# Patient Record
Sex: FEMALE | Race: WHITE | NOT HISPANIC OR LATINO | Employment: OTHER | URBAN - NONMETROPOLITAN AREA
[De-identification: names, ages, dates, MRNs, and addresses within clinical notes are randomized per-mention and may not be internally consistent; named-entity substitution may affect disease eponyms.]

---

## 2023-12-15 PROBLEM — N18.31 CHRONIC KIDNEY DISEASE, STAGE 3A (MULTI): Status: ACTIVE | Noted: 2023-12-15

## 2023-12-15 PROBLEM — E78.5 HYPERLIPIDEMIA: Status: ACTIVE | Noted: 2023-12-15

## 2023-12-15 PROBLEM — R06.02 SHORTNESS OF BREATH: Status: ACTIVE | Noted: 2023-12-15

## 2023-12-15 PROBLEM — I42.8 NONISCHEMIC CARDIOMYOPATHY (MULTI): Status: ACTIVE | Noted: 2023-12-15

## 2023-12-15 PROBLEM — C80.1 CANCER (MULTI): Status: ACTIVE | Noted: 2023-12-15

## 2023-12-15 PROBLEM — I48.0 PAROXYSMAL ATRIAL FIBRILLATION (MULTI): Status: ACTIVE | Noted: 2023-12-15

## 2023-12-15 PROBLEM — E11.9 DIABETES MELLITUS TYPE II, NON INSULIN DEPENDENT (MULTI): Status: ACTIVE | Noted: 2023-12-15

## 2023-12-15 RX ORDER — LORATADINE 10 MG/1
1 TABLET ORAL DAILY PRN
COMMUNITY

## 2023-12-15 RX ORDER — LEVOTHYROXINE SODIUM 137 UG/1
137 TABLET ORAL
COMMUNITY
Start: 2021-09-09

## 2023-12-15 RX ORDER — CARVEDILOL 6.25 MG/1
1 TABLET ORAL 2 TIMES DAILY
COMMUNITY
Start: 2022-03-03

## 2023-12-15 RX ORDER — DEXAMETHASONE 0.5 MG/5ML
1 SOLUTION ORAL 4 TIMES DAILY PRN
COMMUNITY
Start: 2022-12-30

## 2023-12-15 RX ORDER — GABAPENTIN 800 MG/1
1 TABLET ORAL 3 TIMES DAILY
COMMUNITY

## 2023-12-15 RX ORDER — TOLTERODINE 4 MG/1
4 CAPSULE, EXTENDED RELEASE ORAL
COMMUNITY
Start: 2022-02-25

## 2023-12-15 RX ORDER — METFORMIN HYDROCHLORIDE 1000 MG/1
1000 TABLET ORAL
COMMUNITY

## 2023-12-15 RX ORDER — PROMETHAZINE HYDROCHLORIDE 50 MG/1
25 TABLET ORAL
COMMUNITY
Start: 2022-02-21

## 2023-12-15 RX ORDER — FUROSEMIDE 40 MG/1
40 TABLET ORAL
COMMUNITY
Start: 2023-05-11

## 2023-12-15 RX ORDER — NAPROXEN SODIUM 220 MG/1
81 TABLET, FILM COATED ORAL
COMMUNITY
Start: 2022-09-03

## 2023-12-15 RX ORDER — DICLOFENAC SODIUM 10 MG/G
2 GEL TOPICAL 4 TIMES DAILY
COMMUNITY

## 2023-12-15 RX ORDER — GLUCOSAMINE/CHONDR SU A SOD 167-133 MG
500 CAPSULE ORAL
COMMUNITY

## 2023-12-15 RX ORDER — ALBUTEROL SULFATE 90 UG/1
2 AEROSOL, METERED RESPIRATORY (INHALATION) 2 TIMES DAILY
COMMUNITY

## 2023-12-15 RX ORDER — BUDESONIDE AND FORMOTEROL FUMARATE DIHYDRATE 160; 4.5 UG/1; UG/1
2 AEROSOL RESPIRATORY (INHALATION) 2 TIMES DAILY
COMMUNITY

## 2023-12-15 RX ORDER — AMLODIPINE BESYLATE 2.5 MG/1
2.5 TABLET ORAL
COMMUNITY
Start: 2022-01-25

## 2023-12-15 RX ORDER — FAMOTIDINE 20 MG/1
1 TABLET, FILM COATED ORAL NIGHTLY
COMMUNITY
Start: 2022-04-28 | End: 2024-02-01

## 2023-12-15 RX ORDER — LISINOPRIL 10 MG/1
1 TABLET ORAL 2 TIMES DAILY
COMMUNITY
Start: 2021-12-01

## 2023-12-15 RX ORDER — ONDANSETRON 4 MG/1
4 TABLET, ORALLY DISINTEGRATING ORAL EVERY 8 HOURS PRN
COMMUNITY

## 2023-12-15 RX ORDER — HYDROXYZINE HYDROCHLORIDE 10 MG/1
10 TABLET, FILM COATED ORAL 3 TIMES DAILY PRN
COMMUNITY
Start: 2023-10-18

## 2023-12-15 RX ORDER — NYSTATIN 100000 [USP'U]/ML
500000 SUSPENSION ORAL
COMMUNITY
Start: 2023-02-25

## 2023-12-15 RX ORDER — LIOTHYRONINE SODIUM 5 UG/1
5 TABLET ORAL 2 TIMES DAILY
COMMUNITY

## 2023-12-15 RX ORDER — FLUTICASONE PROPIONATE 50 MCG
1 SPRAY, SUSPENSION (ML) NASAL 2 TIMES DAILY
COMMUNITY
Start: 2022-09-02

## 2023-12-15 RX ORDER — FOLIC ACID 1 MG/1
1 TABLET ORAL
COMMUNITY
Start: 2023-03-08

## 2023-12-19 ENCOUNTER — OFFICE VISIT (OUTPATIENT)
Dept: CARDIOLOGY | Facility: CLINIC | Age: 73
End: 2023-12-19
Payer: MEDICARE

## 2023-12-19 VITALS
HEIGHT: 63 IN | HEART RATE: 74 BPM | BODY MASS INDEX: 46.07 KG/M2 | WEIGHT: 260 LBS | DIASTOLIC BLOOD PRESSURE: 78 MMHG | SYSTOLIC BLOOD PRESSURE: 110 MMHG

## 2023-12-19 DIAGNOSIS — I48.0 PAROXYSMAL ATRIAL FIBRILLATION (MULTI): ICD-10-CM

## 2023-12-19 DIAGNOSIS — I42.8 NONISCHEMIC CARDIOMYOPATHY (MULTI): Primary | ICD-10-CM

## 2023-12-19 DIAGNOSIS — E78.2 MIXED HYPERLIPIDEMIA: ICD-10-CM

## 2023-12-19 PROCEDURE — 1036F TOBACCO NON-USER: CPT | Performed by: INTERNAL MEDICINE

## 2023-12-19 PROCEDURE — 1159F MED LIST DOCD IN RCRD: CPT | Performed by: INTERNAL MEDICINE

## 2023-12-19 PROCEDURE — 4010F ACE/ARB THERAPY RXD/TAKEN: CPT | Performed by: INTERNAL MEDICINE

## 2023-12-19 PROCEDURE — 99214 OFFICE O/P EST MOD 30 MIN: CPT | Performed by: INTERNAL MEDICINE

## 2023-12-19 PROCEDURE — 3078F DIAST BP <80 MM HG: CPT | Performed by: INTERNAL MEDICINE

## 2023-12-19 PROCEDURE — 3074F SYST BP LT 130 MM HG: CPT | Performed by: INTERNAL MEDICINE

## 2023-12-19 RX ORDER — CYANOCOBALAMIN (VITAMIN B-12) 500 MCG
400 TABLET ORAL DAILY
COMMUNITY

## 2023-12-19 RX ORDER — TRAMADOL HYDROCHLORIDE 50 MG/1
50 TABLET ORAL EVERY 6 HOURS PRN
COMMUNITY

## 2023-12-19 RX ORDER — CYCLOBENZAPRINE HCL 10 MG
10 TABLET ORAL 3 TIMES DAILY PRN
COMMUNITY

## 2023-12-19 RX ORDER — PNV NO.95/FERROUS FUM/FOLIC AC 28MG-0.8MG
1 TABLET ORAL DAILY
COMMUNITY

## 2023-12-19 NOTE — LETTER
December 19, 2023     Merry Diaz DO  [address]  [place] [address]    Patient: Clara Soto   YOB: 1950   Date of Visit: 12/19/2023       Dear Dr. Merry Diaz DO:    Thank you for referring Clara Soto to me for evaluation. Below are my notes for this consultation.  If you have questions, please do not hesitate to call me. I look forward to following your patient along with you.       Sincerely,     Arturo Handy MD      CC: No Recipients  ______________________________________________________________________________________    Subjective   Clara Soto is a 73 y.o. female       Chief Complaint    Follow-up          HPI     Patient returns in follow-up of problems as noted.  In interim she is done well.  She has a metastatic malignancy of unknown primary.  She has been treated for with chemotherapy.  She states there may be an upcoming right maxillary sinus surgery because of a tumor that is been identified there.  She apparently has a couple spots on her lungs and multiple spots on the liver.  Nonetheless she looks good and is done well has not lost weight and has no overt manifestations of malignancy.    In regards to her cardiomyopathy an echocardiogram was performed last year in [place] and the results are favorable and do not suggest the need for further adjustments in therapy.  She is tolerating her treatment for hyperlipidemia and she feels convinced that she can tell if or when she is in atrial fibrillation.  She would seek care if it arises.  We discussed the concept of antiarrhythmic therapy but she is reluctant and/or hesitant because of the complexity of her malignancy treatment and the possibility that anticoagulants would cause problems and I believe it is a reasonable concern.  Because of this and the fact that she is under constant surveillance by numerous healthcare providers it is my belief that paroxysms of atrial  "fibrillation probably would be noted or detected and treated appropriately and hence we do not feel that she needs to be mandated to antithrombotic therapy.        Visit Vitals  /78 (BP Location: Left arm, Patient Position: Sitting)   Pulse 74   Ht 1.6 m (5' 3\")   Wt 118 kg (260 lb)   BMI 46.06 kg/m²   Smoking Status Former   BSA 2.29 m²        Objective   Physical Exam  Constitutional:       Appearance: Normal appearance. She is normal weight.   HENT:      Nose: Nose normal.   Neck:      Vascular: No carotid bruit.   Cardiovascular:      Rate and Rhythm: Normal rate.      Pulses: Normal pulses.      Heart sounds: Normal heart sounds.   Pulmonary:      Effort: Pulmonary effort is normal.   Abdominal:      General: Bowel sounds are normal.      Palpations: Abdomen is soft.   Genitourinary:     Rectum: Normal.   Musculoskeletal:         General: Normal range of motion.      Cervical back: Normal range of motion.      Right lower leg: No edema.      Left lower leg: No edema.   Skin:     General: Skin is warm and dry.   Neurological:      General: No focal deficit present.      Mental Status: She is alert.   Psychiatric:         Mood and Affect: Mood normal.         Behavior: Behavior normal.         Thought Content: Thought content normal.         Judgment: Judgment normal.         Current Medications    Current Outpatient Medications:   •  albuterol 90 mcg/actuation inhaler, Inhale 2 puffs twice a day., Disp: , Rfl:   •  amLODIPine (Norvasc) 2.5 mg tablet, Take 1 tablet (2.5 mg) by mouth once daily., Disp: , Rfl:   •  aspirin 81 mg chewable tablet, Chew 1 tablet (81 mg) once daily., Disp: , Rfl:   •  budesonide-formoteroL (Symbicort) 160-4.5 mcg/actuation inhaler, Inhale 2 puffs twice a day., Disp: , Rfl:   •  calcium carbonate-vitamin D3 (Oyster Shell Calcium-Vit D3) 500 mg-10 mcg (400 unit) tablet, Take 1 tablet by mouth once daily., Disp: , Rfl:   •  carvedilol (Coreg) 6.25 mg tablet, Take 1 tablet (6.25 mg) " by mouth 2 times a day., Disp: , Rfl:   •  cholecalciferol (Vitamin D3) 10 MCG (400 UNIT) tablet, Take 1 tablet (10 mcg) by mouth once daily., Disp: , Rfl:   •  cyclobenzaprine (Flexeril) 10 mg tablet, Take 1 tablet (10 mg) by mouth 3 times a day as needed for muscle spasms., Disp: , Rfl:   •  dexAMETHasone 0.5 mg/5 mL oral liquid, Take 10 mL (1 mg) by mouth 4 times a day as needed., Disp: , Rfl:   •  diclofenac sodium (Voltaren) 1 % gel gel, Apply 0.5 Applications topically 4 times a day., Disp: , Rfl:   •  famotidine (Pepcid) 20 mg tablet, Take 1 tablet (20 mg) by mouth once daily at bedtime., Disp: , Rfl:   •  fluticasone (Flonase) 50 mcg/actuation nasal spray, Administer 1 spray into affected nostril(s) twice a day., Disp: , Rfl:   •  furosemide (Lasix) 40 mg tablet, Take 1 tablet (40 mg) by mouth once daily., Disp: , Rfl:   •  gabapentin (Neurontin) 800 mg tablet, Take 1 tablet (800 mg) by mouth 3 times a day., Disp: , Rfl:   •  hydrOXYzine HCL (Atarax) 10 mg tablet, Take 1 tablet (10 mg) by mouth 3 times a day as needed., Disp: , Rfl:   •  levothyroxine (Synthroid, Levoxyl) 137 mcg tablet, Take 1 tablet (137 mcg) by mouth once daily., Disp: , Rfl:   •  liothyronine (Cytomel) 5 mcg tablet, Take 1 tablet (5 mcg) by mouth twice a day., Disp: , Rfl:   •  lisinopril 10 mg tablet, Take 1 tablet (10 mg) by mouth 2 times a day., Disp: , Rfl:   •  loratadine (Claritin) 10 mg tablet, Take 1 tablet (10 mg) by mouth once daily as needed., Disp: , Rfl:   •  metFORMIN (Glucophage) 1,000 mg tablet, Take 1 tablet (1,000 mg) by mouth 2 times a day with meals., Disp: , Rfl:   •  niacin 500 mg tablet, Take 1 tablet (500 mg) by mouth once daily with breakfast., Disp: , Rfl:   •  nystatin (Mycostatin) 100,000 unit/mL suspension, Place 5 mL (500,000 Units) into mouth between cheek and gum., Disp: , Rfl:   •  ondansetron ODT (Zofran-ODT) 4 mg disintegrating tablet, Take 1 tablet (4 mg) by mouth every 8 hours if needed., Disp: , Rfl:    •  promethazine (Phenergan) 50 mg tablet, Take 0.5 tablets (25 mg) by mouth., Disp: , Rfl:   •  tolterodine LA (Detrol LA) 4 mg 24 hr capsule, Take 1 capsule (4 mg) by mouth once daily., Disp: , Rfl:   •  traMADol (Ultram) 50 mg tablet, Take 1 tablet (50 mg) by mouth every 6 hours if needed for severe pain (7 - 10)., Disp: , Rfl:   •  folic acid (Folvite) 1 mg tablet, Take 1 tablet (1 mg) by mouth once daily., Disp: , Rfl:                      Assessment/Plan     1. Nonischemic cardiomyopathy (CMS/HCC)  Ejection fraction 45% and functional class I.  No adjustments in therapy needed.    2. Paroxysmal atrial fibrillation (CMS/HCC)  No symptoms.  The patient was educated regarding symptoms to watch for and she will call if they arise or occur.  We trust that other healthcare providers will detect irregular rhythm if it arises    3. Mixed hyperlipidemia  Good control on current therapy.        Scribe Attestation  By signing my name below, I, Moni WASSERMAN LPN , Scribe   attest that this documentation has been prepared under the direction and in the presence of Arturo Handy MD.

## 2023-12-19 NOTE — PROGRESS NOTES
"Subjective   Clara Soto is a 73 y.o. female       Chief Complaint    Follow-up          HPI     Patient returns in follow-up of problems as noted.  In interim she is done well.  She has a metastatic malignancy of unknown primary.  She has been treated for with chemotherapy.  She states there may be an upcoming right maxillary sinus surgery because of a tumor that is been identified there.  She apparently has a couple spots on her lungs and multiple spots on the liver.  Nonetheless she looks good and is done well has not lost weight and has no overt manifestations of malignancy.    In regards to her cardiomyopathy an echocardiogram was performed last year in [place] and the results are favorable and do not suggest the need for further adjustments in therapy.  She is tolerating her treatment for hyperlipidemia and she feels convinced that she can tell if or when she is in atrial fibrillation.  She would seek care if it arises.  We discussed the concept of antiarrhythmic therapy but she is reluctant and/or hesitant because of the complexity of her malignancy treatment and the possibility that anticoagulants would cause problems and I believe it is a reasonable concern.  Because of this and the fact that she is under constant surveillance by numerous healthcare providers it is my belief that paroxysms of atrial fibrillation probably would be noted or detected and treated appropriately and hence we do not feel that she needs to be mandated to antithrombotic therapy.        Visit Vitals  /78 (BP Location: Left arm, Patient Position: Sitting)   Pulse 74   Ht 1.6 m (5' 3\")   Wt 118 kg (260 lb)   BMI 46.06 kg/m²   Smoking Status Former   BSA 2.29 m²        Objective   Physical Exam  Constitutional:       Appearance: Normal appearance. She is normal weight.   HENT:      Nose: Nose normal.   Neck:      Vascular: No carotid bruit.   Cardiovascular:      Rate and Rhythm: Normal rate.      Pulses: Normal pulses. "      Heart sounds: Normal heart sounds.   Pulmonary:      Effort: Pulmonary effort is normal.   Abdominal:      General: Bowel sounds are normal.      Palpations: Abdomen is soft.   Genitourinary:     Rectum: Normal.   Musculoskeletal:         General: Normal range of motion.      Cervical back: Normal range of motion.      Right lower leg: No edema.      Left lower leg: No edema.   Skin:     General: Skin is warm and dry.   Neurological:      General: No focal deficit present.      Mental Status: She is alert.   Psychiatric:         Mood and Affect: Mood normal.         Behavior: Behavior normal.         Thought Content: Thought content normal.         Judgment: Judgment normal.         Current Medications    Current Outpatient Medications:     albuterol 90 mcg/actuation inhaler, Inhale 2 puffs twice a day., Disp: , Rfl:     amLODIPine (Norvasc) 2.5 mg tablet, Take 1 tablet (2.5 mg) by mouth once daily., Disp: , Rfl:     aspirin 81 mg chewable tablet, Chew 1 tablet (81 mg) once daily., Disp: , Rfl:     budesonide-formoteroL (Symbicort) 160-4.5 mcg/actuation inhaler, Inhale 2 puffs twice a day., Disp: , Rfl:     calcium carbonate-vitamin D3 (Oyster Shell Calcium-Vit D3) 500 mg-10 mcg (400 unit) tablet, Take 1 tablet by mouth once daily., Disp: , Rfl:     carvedilol (Coreg) 6.25 mg tablet, Take 1 tablet (6.25 mg) by mouth 2 times a day., Disp: , Rfl:     cholecalciferol (Vitamin D3) 10 MCG (400 UNIT) tablet, Take 1 tablet (10 mcg) by mouth once daily., Disp: , Rfl:     cyclobenzaprine (Flexeril) 10 mg tablet, Take 1 tablet (10 mg) by mouth 3 times a day as needed for muscle spasms., Disp: , Rfl:     dexAMETHasone 0.5 mg/5 mL oral liquid, Take 10 mL (1 mg) by mouth 4 times a day as needed., Disp: , Rfl:     diclofenac sodium (Voltaren) 1 % gel gel, Apply 0.5 Applications topically 4 times a day., Disp: , Rfl:     famotidine (Pepcid) 20 mg tablet, Take 1 tablet (20 mg) by mouth once daily at bedtime., Disp: , Rfl:      fluticasone (Flonase) 50 mcg/actuation nasal spray, Administer 1 spray into affected nostril(s) twice a day., Disp: , Rfl:     furosemide (Lasix) 40 mg tablet, Take 1 tablet (40 mg) by mouth once daily., Disp: , Rfl:     gabapentin (Neurontin) 800 mg tablet, Take 1 tablet (800 mg) by mouth 3 times a day., Disp: , Rfl:     hydrOXYzine HCL (Atarax) 10 mg tablet, Take 1 tablet (10 mg) by mouth 3 times a day as needed., Disp: , Rfl:     levothyroxine (Synthroid, Levoxyl) 137 mcg tablet, Take 1 tablet (137 mcg) by mouth once daily., Disp: , Rfl:     liothyronine (Cytomel) 5 mcg tablet, Take 1 tablet (5 mcg) by mouth twice a day., Disp: , Rfl:     lisinopril 10 mg tablet, Take 1 tablet (10 mg) by mouth 2 times a day., Disp: , Rfl:     loratadine (Claritin) 10 mg tablet, Take 1 tablet (10 mg) by mouth once daily as needed., Disp: , Rfl:     metFORMIN (Glucophage) 1,000 mg tablet, Take 1 tablet (1,000 mg) by mouth 2 times a day with meals., Disp: , Rfl:     niacin 500 mg tablet, Take 1 tablet (500 mg) by mouth once daily with breakfast., Disp: , Rfl:     nystatin (Mycostatin) 100,000 unit/mL suspension, Place 5 mL (500,000 Units) into mouth between cheek and gum., Disp: , Rfl:     ondansetron ODT (Zofran-ODT) 4 mg disintegrating tablet, Take 1 tablet (4 mg) by mouth every 8 hours if needed., Disp: , Rfl:     promethazine (Phenergan) 50 mg tablet, Take 0.5 tablets (25 mg) by mouth., Disp: , Rfl:     tolterodine LA (Detrol LA) 4 mg 24 hr capsule, Take 1 capsule (4 mg) by mouth once daily., Disp: , Rfl:     traMADol (Ultram) 50 mg tablet, Take 1 tablet (50 mg) by mouth every 6 hours if needed for severe pain (7 - 10)., Disp: , Rfl:     folic acid (Folvite) 1 mg tablet, Take 1 tablet (1 mg) by mouth once daily., Disp: , Rfl:                      Assessment/Plan     1. Nonischemic cardiomyopathy (CMS/HCC)  Ejection fraction 45% and functional class I.  No adjustments in therapy needed.    2. Paroxysmal atrial fibrillation  (CMS/HCC)  No symptoms.  The patient was educated regarding symptoms to watch for and she will call if they arise or occur.  We trust that other healthcare providers will detect irregular rhythm if it arises    3. Mixed hyperlipidemia  Good control on current therapy.        Scribe Attestation  By signing my name below, IMoni LPN , Scribe   attest that this documentation has been prepared under the direction and in the presence of Arturo Handy MD.

## 2024-01-27 DIAGNOSIS — K21.9 GASTROESOPHAGEAL REFLUX DISEASE WITHOUT ESOPHAGITIS: ICD-10-CM

## 2024-02-01 RX ORDER — FAMOTIDINE 20 MG/1
20 TABLET, FILM COATED ORAL NIGHTLY
Qty: 90 TABLET | Refills: 3 | Status: SHIPPED | OUTPATIENT
Start: 2024-02-01

## 2024-06-29 DIAGNOSIS — I42.8 NONISCHEMIC CARDIOMYOPATHY (MULTI): ICD-10-CM

## 2024-07-03 RX ORDER — CARVEDILOL 6.25 MG/1
6.25 TABLET ORAL 2 TIMES DAILY
Qty: 180 TABLET | Refills: 3 | Status: SHIPPED | OUTPATIENT
Start: 2024-07-03 | End: 2025-07-03

## 2024-12-17 ENCOUNTER — APPOINTMENT (OUTPATIENT)
Dept: CARDIOLOGY | Facility: CLINIC | Age: 74
End: 2024-12-17
Payer: MEDICARE

## 2024-12-17 VITALS
DIASTOLIC BLOOD PRESSURE: 76 MMHG | HEART RATE: 76 BPM | BODY MASS INDEX: 49.26 KG/M2 | HEIGHT: 63 IN | SYSTOLIC BLOOD PRESSURE: 130 MMHG | WEIGHT: 278 LBS

## 2024-12-17 DIAGNOSIS — I10 ESSENTIAL HYPERTENSION: ICD-10-CM

## 2024-12-17 DIAGNOSIS — E78.2 MIXED HYPERLIPIDEMIA: ICD-10-CM

## 2024-12-17 DIAGNOSIS — N18.31 CHRONIC KIDNEY DISEASE, STAGE 3A (MULTI): ICD-10-CM

## 2024-12-17 DIAGNOSIS — N18.31 TYPE 1 DIABETES MELLITUS WITH STAGE 3A CHRONIC KIDNEY DISEASE (MULTI): ICD-10-CM

## 2024-12-17 DIAGNOSIS — I48.0 PAROXYSMAL ATRIAL FIBRILLATION (MULTI): Primary | ICD-10-CM

## 2024-12-17 DIAGNOSIS — I42.8 NONISCHEMIC CARDIOMYOPATHY (MULTI): ICD-10-CM

## 2024-12-17 DIAGNOSIS — E10.22 TYPE 1 DIABETES MELLITUS WITH STAGE 3A CHRONIC KIDNEY DISEASE (MULTI): ICD-10-CM

## 2024-12-17 PROBLEM — E11.9 DIABETES MELLITUS TYPE II, NON INSULIN DEPENDENT (MULTI): Status: RESOLVED | Noted: 2023-12-15 | Resolved: 2024-12-17

## 2024-12-17 PROCEDURE — 1036F TOBACCO NON-USER: CPT | Performed by: NURSE PRACTITIONER

## 2024-12-17 PROCEDURE — 99214 OFFICE O/P EST MOD 30 MIN: CPT | Performed by: NURSE PRACTITIONER

## 2024-12-17 PROCEDURE — 1160F RVW MEDS BY RX/DR IN RCRD: CPT | Performed by: NURSE PRACTITIONER

## 2024-12-17 PROCEDURE — 3078F DIAST BP <80 MM HG: CPT | Performed by: NURSE PRACTITIONER

## 2024-12-17 PROCEDURE — 4010F ACE/ARB THERAPY RXD/TAKEN: CPT | Performed by: NURSE PRACTITIONER

## 2024-12-17 PROCEDURE — 3075F SYST BP GE 130 - 139MM HG: CPT | Performed by: NURSE PRACTITIONER

## 2024-12-17 PROCEDURE — 1159F MED LIST DOCD IN RCRD: CPT | Performed by: NURSE PRACTITIONER

## 2024-12-17 PROCEDURE — 3008F BODY MASS INDEX DOCD: CPT | Performed by: NURSE PRACTITIONER

## 2024-12-17 RX ORDER — FLUTICASONE FUROATE AND VILANTEROL TRIFENATATE 100; 25 UG/1; UG/1
POWDER RESPIRATORY (INHALATION)
COMMUNITY

## 2024-12-17 RX ORDER — INSULIN LISPRO 200 [IU]/ML
INJECTION, SOLUTION SUBCUTANEOUS
COMMUNITY
Start: 2024-11-02

## 2024-12-17 RX ORDER — VITAMIN B COMPLEX
1 CAPSULE ORAL DAILY
COMMUNITY

## 2024-12-17 RX ORDER — BENZONATATE 200 MG/1
1 CAPSULE ORAL
COMMUNITY
Start: 2024-12-05

## 2024-12-17 RX ORDER — LISINOPRIL 10 MG/1
10 TABLET ORAL DAILY
Qty: 90 TABLET | Refills: 3 | Status: SHIPPED | OUTPATIENT
Start: 2024-12-17 | End: 2025-12-17

## 2024-12-17 RX ORDER — CARVEDILOL 6.25 MG/1
6.25 TABLET ORAL 2 TIMES DAILY
Qty: 180 TABLET | Refills: 3 | Status: SHIPPED | OUTPATIENT
Start: 2024-12-17 | End: 2025-12-17

## 2024-12-17 ASSESSMENT — ENCOUNTER SYMPTOMS
IRREGULAR HEARTBEAT: 0
DYSPNEA ON EXERTION: 0
NEAR-SYNCOPE: 0
PND: 0
PALPITATIONS: 0
ORTHOPNEA: 0
SYNCOPE: 0

## 2024-12-17 NOTE — ASSESSMENT & PLAN NOTE
I was able to find a 2012 Holter monitor that revealed a 19 and 5 beat narrow complex tachycardia suggestive of atrial fibrillation.  Patient reports that maybe she had atrial fibrillation following her pregnancy and the initial ADHF admit.  She denies any type of palpitations.  She denies prior anticoagulation.

## 2024-12-17 NOTE — PROGRESS NOTES
"Chief Complaint  \" Doing pretty well\"    Reason for Visit  Annual follow-up  Patient presents to the office today for outpatient follow-up for nonischemic cardiomyopathy  Last evaluated in clinic by Dr. Vasquez December 2023    Presents today ambulatory with rollator and steady gait.  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  Labs from September 2024 reviewed.    History of Present Illness   Patient is an extremely pleasant 74-year-old female who presents to the office today with no voiced cardiovascular complaints.  She is very familiar with her medical history and we have done an extensive review today.  Overall she goes up and down flights of stairs on a regular basis with minimal shortness of breath with seems to be her baseline.  She uses wedge pillows without any change or worsening orthopnea.  She reports prior obstructive sleep apnea testing and was\" borderline\" but was unable to tolerate CPAP machine.  She denies any edema.  No palpitations, dizziness or lightheadedness.  Denies any change in exercise capacity or functional tolerance.    Her activity is only limited due to some balance issues, is undergoing physical therapy.  She did have 1 accidental fall and injured her ankle earlier this year.    Patient reports that overall has no complaint(s) of chest pain, chest pressure/discomfort, claudication, dyspnea, exertional chest pressure/discomfort, and fatigue    Daily activity:    Greater than 4 METS-is attending physical therapy.  Denies any change in exercise capacity or functional tolerance since last office visit.    The importance of primary prevention reviewed:  HTN: Optimal in office  HLD: Statin intolerant on niacin  DM: Follows routinely with endocrine September 2024 hemoglobin A1c 7.8  Smoker: " "Denies  BMI:  Reviewed the merits of healthy lifestyle choices on overall cardiovascular health.    Overall patient is pleased with current state of cardiovascular health.  At this time there are no indications for additional cardiovascular testing or need for medication changes.     Review of Systems   Cardiovascular:  Negative for chest pain, dyspnea on exertion, irregular heartbeat, leg swelling, near-syncope, orthopnea, palpitations, paroxysmal nocturnal dyspnea and syncope.        Visit Vitals  /76 (BP Location: Left arm, Patient Position: Sitting)   Pulse 76   Ht 1.6 m (5' 3\")   Wt 126 kg (278 lb)   BMI 49.25 kg/m²   Smoking Status Former   BSA 2.37 m²     Physical Exam  Vitals and nursing note reviewed.   HENT:      Head: Normocephalic.   Cardiovascular:      Rate and Rhythm: Normal rate and regular rhythm.      Heart sounds: Normal heart sounds.   Pulmonary:      Effort: Pulmonary effort is normal.      Breath sounds: Normal breath sounds.   Abdominal:      Palpations: Abdomen is soft.   Musculoskeletal:      Right lower leg: No edema.      Left lower leg: No edema.   Skin:     General: Skin is warm and dry.   Neurological:      General: No focal deficit present.      Mental Status: She is alert.   Psychiatric:         Mood and Affect: Mood normal.         Behavior: Behavior normal.        Allergies   Allergen Reactions    Lidocaine Anaphylaxis     facial injections    Other Reaction(s): Stopped Breathing      facial injections    Codeine Anxiety and Hallucinations     Other Reaction(s): gets whacky    Morphine Hallucinations    Penicillins Other, Itching and Rash    Chlorpheniramine-Pseudoephed Unknown    Coconut Oil Unknown     PT STATES COUGHING AND NEEDS NEW INHALER MEDS    Coconut Oil, Hydrogenated Unknown     PT STATES COUGHING AND NEEDS NEW INHALER MEDS    Mepivacaine Unknown    Ozempic [Semaglutide] Nausea/vomiting    Penicillin G Benzathine Unknown    Rosuvastatin Myalgia    Chlortetracycline " "Rash     Current Outpatient Medications   Medication Instructions    amLODIPine (NORVASC) 2.5 mg, Daily RT    aspirin 81 mg, Daily RT    b complex vitamins capsule 1 capsule, Daily    benzonatate (Tessalon) 200 mg capsule 1 capsule, 3 times daily (0900,1400,1900)    Breo Ellipta 100-25 mcg/dose inhaler TAKE 2 INHALATIONS TWICE DAILY    carvedilol (COREG) 6.25 mg, oral, 2 times daily    cholecalciferol (VITAMIN D3) 400 Units, Daily    diclofenac sodium (VOLTAREN) 2 g, 4 times daily    famotidine (PEPCID) 20 mg, oral, Nightly    fluticasone (Flonase) 50 mcg/actuation nasal spray 1 spray, 2 times daily    folic acid (FOLVITE) 1 mg, Daily RT    gabapentin (Neurontin) 800 mg tablet 1 tablet, 3 times daily    HumaLOG KwikPen Insulin 200 unit/mL (3 mL) insulin pen pen USE AS DIRECTED UP  UNITS DAILY VIA INSULIN PUMP.    levothyroxine (SYNTHROID, LEVOXYL) 137 mcg, Daily RT    lisinopril 10 mg, oral, Daily    metFORMIN (GLUCOPHAGE) 1,000 mg, 2 times daily (morning and late afternoon)    niacin 500 mg, Daily with breakfast    ondansetron ODT (ZOFRAN-ODT) 4 mg, Every 8 hours PRN    promethazine (PHENERGAN) 25 mg    tolterodine LA (DETROL LA) 4 mg, Daily RT    traMADol (ULTRAM) 50 mg, Every 6 hours PRN      Assessment:    Hyperlipidemia  In past had \"cramping\" due to statins (Endocrine has tried them)  Tolerating niacin      Type 1 diabetes mellitus with stage 3a chronic kidney disease (Multi)  Reports \"has progressed to Type I this year and is now on insulin pump - metformin because pancreas has not completely shut down\"  Sept 2024 HgA1c 7.8    Chronic kidney disease, stage 3a (Multi)  Follows with Nephrology    Nonischemic cardiomyopathy (Multi)  Patient reports dilated cardiomyopathy was diagnosed greater than 30 years ago after her pregnancy.  She reports going to [facility] and placed on Coreg and lisinopril at that time.  From our EMR:  2001 TTE LVEF 45%  2008 TTE LVEF 40%  2014 TTE LVEF 50 to " 55%  September 2022 TTE LVEF 40 to 45% with anterior septal hypokinesis    I believe she was referred to Dr. Vasquez around 2014 at which time he did a cardiac cath showing angiographically normal coronaries, LVEF noted to be 60% at that time.    See reports last ADHF 'a very long time ago'    NICM HF borderline EF 40-45% Sept 2022 TTE  FC 2b Stage C    GDMT:  Mainstay of treatment has been carvedilol and lisinopril greater than 30 years.  No Jardiance due to type 1 diabetes  No Aldactone -on some labs there is a tendency for hyperkalemia      Paroxysmal atrial fibrillation (Multi)  I was able to find a 2012 Holter monitor that revealed a 19 and 5 beat narrow complex tachycardia suggestive of atrial fibrillation.  Patient reports that maybe she had atrial fibrillation following her pregnancy and the initial ADHF admit.  She denies any type of palpitations.  She denies prior anticoagulation.    Essential hypertension  Optimal in office    BMI 45.0-49.9, adult (Multi)  Reviewed the merits of healthy lifestyle choices on overall cardiovascular health.      Plan:     Through informed decision making process incorporating patients unique circumstances, the following treatment plan will be initiated:    1.  Prescription drug management of cardiovascular medication for efficacy, adherence to treatment, side effect assessment and polypharmacy. Current treatment clinically warranted and to continue without modifications.    2. Return for follow-up; in the interim, contact the office if new symptoms arise.  NP annual     Karely Valdivia MSN, APRN-CNP, PMHNP-[facility] [place]  [place], [place]     Please excuse any errors in grammar or translation related to this dictation. Voice recognition software was utilized to prepare this document.

## 2024-12-17 NOTE — LETTER
"December 17, 2024     Merry Diaz DO  [address] [place] [address]    Patient: Clara Soto   YOB: 1950   Date of Visit: 12/17/2024       Dear Dr. Merry Diaz DO:    Thank you for referring Clara Soto to me for evaluation. Below are my notes for this consultation.  If you have questions, please do not hesitate to call me. I look forward to following your patient along with you.       Sincerely,     Karely Valdivia, APRN-CNP      CC: No Recipients  ______________________________________________________________________________________    Chief Complaint  \" Doing pretty well\"    Reason for Visit  Annual follow-up  Patient presents to the office today for outpatient follow-up for nonischemic cardiomyopathy  Last evaluated in clinic by Dr. Vasquez December 2023    Presents today ambulatory with rollator and steady gait.  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  Labs from September 2024 reviewed.    History of Present Illness   Patient is an extremely pleasant 74-year-old female who presents to the office today with no voiced cardiovascular complaints.  She is very familiar with her medical history and we have done an extensive review today.  Overall she goes up and down flights of stairs on a regular basis with minimal shortness of breath with seems to be her baseline.  She uses wedge pillows without any change or worsening orthopnea.  She reports prior obstructive sleep apnea testing and was\" borderline\" but was unable to tolerate CPAP machine.  She denies any edema.  No palpitations, dizziness or lightheadedness.  Denies any change in exercise capacity or functional tolerance.    Her activity is only limited due to some balance issues, is undergoing physical " "therapy.  She did have 1 accidental fall and injured her ankle earlier this year.    Patient reports that overall has no complaint(s) of chest pain, chest pressure/discomfort, claudication, dyspnea, exertional chest pressure/discomfort, and fatigue    Daily activity:    Greater than 4 METS-is attending physical therapy.  Denies any change in exercise capacity or functional tolerance since last office visit.    The importance of primary prevention reviewed:  HTN: Optimal in office  HLD: Statin intolerant on niacin  DM: Follows routinely with endocrine September 2024 hemoglobin A1c 7.8  Smoker: Denies  BMI:  Reviewed the merits of healthy lifestyle choices on overall cardiovascular health.    Overall patient is pleased with current state of cardiovascular health.  At this time there are no indications for additional cardiovascular testing or need for medication changes.     Review of Systems   Cardiovascular:  Negative for chest pain, dyspnea on exertion, irregular heartbeat, leg swelling, near-syncope, orthopnea, palpitations, paroxysmal nocturnal dyspnea and syncope.        Visit Vitals  /76 (BP Location: Left arm, Patient Position: Sitting)   Pulse 76   Ht 1.6 m (5' 3\")   Wt 126 kg (278 lb)   BMI 49.25 kg/m²   Smoking Status Former   BSA 2.37 m²     Physical Exam  Vitals and nursing note reviewed.   HENT:      Head: Normocephalic.   Cardiovascular:      Rate and Rhythm: Normal rate and regular rhythm.      Heart sounds: Normal heart sounds.   Pulmonary:      Effort: Pulmonary effort is normal.      Breath sounds: Normal breath sounds.   Abdominal:      Palpations: Abdomen is soft.   Musculoskeletal:      Right lower leg: No edema.      Left lower leg: No edema.   Skin:     General: Skin is warm and dry.   Neurological:      General: No focal deficit present.      Mental Status: She is alert.   Psychiatric:         Mood and Affect: Mood normal.         Behavior: Behavior normal.        Allergies   Allergen " "Reactions   • Lidocaine Anaphylaxis     facial injections    Other Reaction(s): Stopped Breathing      facial injections   • Codeine Anxiety and Hallucinations     Other Reaction(s): gets whacky   • Morphine Hallucinations   • Penicillins Other, Itching and Rash   • Chlorpheniramine-Pseudoephed Unknown   • Coconut Oil Unknown     PT STATES COUGHING AND NEEDS NEW INHALER MEDS   • Coconut Oil, Hydrogenated Unknown     PT STATES COUGHING AND NEEDS NEW INHALER MEDS   • Mepivacaine Unknown   • Ozempic [Semaglutide] Nausea/vomiting   • Penicillin G Benzathine Unknown   • Rosuvastatin Myalgia   • Chlortetracycline Rash     Current Outpatient Medications   Medication Instructions   • amLODIPine (NORVASC) 2.5 mg, Daily RT   • aspirin 81 mg, Daily RT   • b complex vitamins capsule 1 capsule, Daily   • benzonatate (Tessalon) 200 mg capsule 1 capsule, 3 times daily (0900,1400,1900)   • Breo Ellipta 100-25 mcg/dose inhaler TAKE 2 INHALATIONS TWICE DAILY   • carvedilol (COREG) 6.25 mg, oral, 2 times daily   • cholecalciferol (VITAMIN D3) 400 Units, Daily   • diclofenac sodium (VOLTAREN) 2 g, 4 times daily   • famotidine (PEPCID) 20 mg, oral, Nightly   • fluticasone (Flonase) 50 mcg/actuation nasal spray 1 spray, 2 times daily   • folic acid (FOLVITE) 1 mg, Daily RT   • gabapentin (Neurontin) 800 mg tablet 1 tablet, 3 times daily   • HumaLOG KwikPen Insulin 200 unit/mL (3 mL) insulin pen pen USE AS DIRECTED UP  UNITS DAILY VIA INSULIN PUMP.   • levothyroxine (SYNTHROID, LEVOXYL) 137 mcg, Daily RT   • lisinopril 10 mg, oral, Daily   • metFORMIN (GLUCOPHAGE) 1,000 mg, 2 times daily (morning and late afternoon)   • niacin 500 mg, Daily with breakfast   • ondansetron ODT (ZOFRAN-ODT) 4 mg, Every 8 hours PRN   • promethazine (PHENERGAN) 25 mg   • tolterodine LA (DETROL LA) 4 mg, Daily RT   • traMADol (ULTRAM) 50 mg, Every 6 hours PRN      Assessment:    Hyperlipidemia  In past had \"cramping\" due to statins (Endocrine has tried " "them)  Tolerating niacin      Type 1 diabetes mellitus with stage 3a chronic kidney disease (Multi)  Reports \"has progressed to Type I this year and is now on insulin pump - metformin because pancreas has not completely shut down\"  Sept 2024 HgA1c 7.8    Chronic kidney disease, stage 3a (Multi)  Follows with Nephrology    Nonischemic cardiomyopathy (Multi)  Patient reports dilated cardiomyopathy was diagnosed greater than 30 years ago after her pregnancy.  She reports going to [facility] and placed on Coreg and lisinopril at that time.  From our EMR:  2001 TTE LVEF 45%  2008 TTE LVEF 40%  2014 TTE LVEF 50 to 55%  September 2022 TTE LVEF 40 to 45% with anterior septal hypokinesis    I believe she was referred to Dr. Vasquez around 2014 at which time he did a cardiac cath showing angiographically normal coronaries, LVEF noted to be 60% at that time.    See reports last ADHF 'a very long time ago'    NICM HF borderline EF 40-45% Sept 2022 TTE  FC 2b Stage C    GDMT:  Mainstay of treatment has been carvedilol and lisinopril greater than 30 years.  No Jardiance due to type 1 diabetes  No Aldactone -on some labs there is a tendency for hyperkalemia      Paroxysmal atrial fibrillation (Multi)  I was able to find a 2012 Holter monitor that revealed a 19 and 5 beat narrow complex tachycardia suggestive of atrial fibrillation.  Patient reports that maybe she had atrial fibrillation following her pregnancy and the initial ADHF admit.  She denies any type of palpitations.  She denies prior anticoagulation.    Essential hypertension  Optimal in office    BMI 45.0-49.9, adult (Multi)  Reviewed the merits of healthy lifestyle choices on overall cardiovascular health.      Plan:     Through informed decision making process incorporating patients unique circumstances, the following treatment plan will be initiated:    1.  Prescription drug management of cardiovascular medication for efficacy, adherence to treatment, " side effect assessment and polypharmacy. Current treatment clinically warranted and to continue without modifications.    2. Return for follow-up; in the interim, contact the office if new symptoms arise.  NP annual     Karely Valdivia MSN, APRN-CNP, PMHNP-[facility] [place], [place]     Please excuse any errors in grammar or translation related to this dictation. Voice recognition software was utilized to prepare this document.

## 2024-12-17 NOTE — PATIENT INSTRUCTIONS
Please bring all medicines, vitamins, and herbal supplements with you when you come to the office.    Prescriptions will not be filled unless you are compliant with your follow up appointments or have a follow up appointment scheduled as per instruction of your physician. Refills should be requested at the time of your visit.     PLAN:   Through informed decision making process incorporating patients unique circumstances, the following treatment plan will be initiated:    1.  Prescription drug management of cardiovascular medication for efficacy, adherence to treatment, side effect assessment and polypharmacy. Current treatment clinically warranted and to continue without modifications.    2. Return for follow-up; in the interim, contact the office if new symptoms arise.  NP annual         Ways to Help Prevent Falls at Home    Quick Tips   ? Ask for help if you need it. Most people want to help!   ? Get up slowly after sitting or laying down   ? Wear a medical alert device or keep cell phone in your pocket   ? Use night lights, especially areas near a bathroom   ? Keep the items you use often within reach on a small stool or end table   ? Use an assistive device such as walker or cane, as directed by provider/physical therapy   ? Use a non-slip mat and grab bars in your bathroom. Look for home health sections for best options     Other Areas to Focus On   ? Exercise and nutrition: Regular exercise or taking a falls prevention class are great ways improve strength and balance. Don’t forget to stay hydrated and bring a snack!   ? Medicine side effects: Some medicines can make you sleepy or dizzy, which could cause a fall. Ask your healthcare provider about the side effects your medicines could cause. Be sure to let them know if you take any vitamins or supplements as well.   ? Tripping hazards: Remove items you could trip on, such as loose mats, rugs, cords, and clutter. Wear closed toe shoes with rubber soles.   ?  Health and wellness: Get regular checkups with your healthcare provider, plus routine vision and hearing screenings. Talk with your healthcare provider about:   o Your medicines and the possible side effects - bring them in a bag if that is easier!   o Problems with balance or feeling dizzy   o Ways to promote bone health, such as Vitamin D and calcium supplements   o Questions or concerns about falling     *Ask your healthcare team if you have questions     [facility], 2022

## 2024-12-17 NOTE — ASSESSMENT & PLAN NOTE
Patient reports dilated cardiomyopathy was diagnosed greater than 30 years ago after her pregnancy.  She reports going to [facility] and placed on Coreg and lisinopril at that time.  From our EMR:  2001 TTE LVEF 45%  2008 TTE LVEF 40%  2014 TTE LVEF 50 to 55%  September 2022 TTE LVEF 40 to 45% with anterior septal hypokinesis    I believe she was referred to Dr. Vasquez around 2014 at which time he did a cardiac cath showing angiographically normal coronaries, LVEF noted to be 60% at that time.    See reports last ADHF 'a very long time ago'    NICM HF borderline EF 40-45% Sept 2022 TTE  FC 2b Stage C    GDMT:  Mainstay of treatment has been carvedilol and lisinopril greater than 30 years.  No Jardiance due to type 1 diabetes  No Aldactone -on some labs there is a tendency for hyperkalemia

## 2024-12-17 NOTE — ASSESSMENT & PLAN NOTE
"Reports \"has progressed to Type I this year and is now on insulin pump - metformin because pancreas has not completely shut down\"  Sept 2024 HgA1c 7.8  "

## 2025-04-28 DIAGNOSIS — K21.9 GASTROESOPHAGEAL REFLUX DISEASE WITHOUT ESOPHAGITIS: ICD-10-CM

## 2025-04-28 RX ORDER — FAMOTIDINE 20 MG/1
20 TABLET, FILM COATED ORAL NIGHTLY
Qty: 90 TABLET | Refills: 3 | Status: SHIPPED | OUTPATIENT
Start: 2025-04-28 | End: 2026-04-28

## 2025-12-17 ENCOUNTER — APPOINTMENT (OUTPATIENT)
Dept: CARDIOLOGY | Facility: CLINIC | Age: 75
End: 2025-12-17
Payer: MEDICARE